# Patient Record
Sex: FEMALE | Race: WHITE
[De-identification: names, ages, dates, MRNs, and addresses within clinical notes are randomized per-mention and may not be internally consistent; named-entity substitution may affect disease eponyms.]

---

## 2023-12-11 ENCOUNTER — APPOINTMENT (OUTPATIENT)
Dept: OBGYN | Facility: CLINIC | Age: 28
End: 2023-12-11
Payer: MEDICAID

## 2023-12-11 VITALS
WEIGHT: 139 LBS | HEIGHT: 66 IN | BODY MASS INDEX: 22.34 KG/M2 | SYSTOLIC BLOOD PRESSURE: 112 MMHG | DIASTOLIC BLOOD PRESSURE: 75 MMHG

## 2023-12-11 DIAGNOSIS — Z32.01 ENCOUNTER FOR PREGNANCY TEST, RESULT POSITIVE: ICD-10-CM

## 2023-12-11 LAB
BILIRUB UR QL STRIP: NORMAL
GLUCOSE UR-MCNC: NORMAL
HCG UR QL: 0.2 EU/DL
HGB UR QL STRIP.AUTO: NORMAL
KETONES UR-MCNC: NORMAL
LEUKOCYTE ESTERASE UR QL STRIP: NORMAL
NITRITE UR QL STRIP: NORMAL
PH UR STRIP: 6.5
PROT UR STRIP-MCNC: NORMAL
SP GR UR STRIP: 1

## 2023-12-11 PROCEDURE — 90715 TDAP VACCINE 7 YRS/> IM: CPT

## 2023-12-11 PROCEDURE — 90471 IMMUNIZATION ADMIN: CPT

## 2023-12-11 PROCEDURE — 99203 OFFICE O/P NEW LOW 30 MIN: CPT | Mod: 25

## 2023-12-11 PROCEDURE — 81003 URINALYSIS AUTO W/O SCOPE: CPT | Mod: QW

## 2023-12-11 PROCEDURE — 76805 OB US >/= 14 WKS SNGL FETUS: CPT

## 2023-12-12 DIAGNOSIS — D64.9 ANEMIA, UNSPECIFIED: ICD-10-CM

## 2023-12-12 LAB
ABO + RH PNL BLD: NORMAL
BLD GP AB SCN SERPL QL: NORMAL
C TRACH RRNA SPEC QL NAA+PROBE: NOT DETECTED
GLUCOSE 1H P 50 G GLC PO SERPL-MCNC: 145 MG/DL
HBV SURFACE AG SER QL: NONREACTIVE
HCT VFR BLD CALC: 32.3 %
HCV AB SER QL: NONREACTIVE
HCV S/CO RATIO: 0.1 S/CO
HGB BLD-MCNC: 9.6 G/DL
HIV1+2 AB SPEC QL IA.RAPID: NONREACTIVE
MCHC RBC-ENTMCNC: 25.2 PG
MCHC RBC-ENTMCNC: 29.7 GM/DL
MCV RBC AUTO: 84.8 FL
MEV IGG FLD QL IA: 31.5 AU/ML
MEV IGG+IGM SER-IMP: POSITIVE
MUV AB SER-ACNC: NEGATIVE
MUV IGG SER QL IA: 8.6 AU/ML
N GONORRHOEA RRNA SPEC QL NAA+PROBE: NOT DETECTED
PLATELET # BLD AUTO: 181 K/UL
RBC # BLD: 3.81 M/UL
RBC # FLD: 15.2 %
RUBV IGG FLD-ACNC: 0.9 INDEX
RUBV IGG SER-IMP: NORMAL
SOURCE AMPLIFICATION: NORMAL
T PALLIDUM AB SER QL IA: NEGATIVE
VZV AB TITR SER: POSITIVE
VZV IGG SER IF-ACNC: 910.6 INDEX
WBC # FLD AUTO: 6.51 K/UL

## 2023-12-12 RX ORDER — CHLORHEXIDINE GLUCONATE 4 %
325 (65 FE) LIQUID (ML) TOPICAL TWICE DAILY
Qty: 120 | Refills: 2 | Status: ACTIVE | COMMUNITY
Start: 2023-12-12 | End: 1900-01-01

## 2023-12-13 LAB
BACTERIA UR CULT: NORMAL
LEAD BLD-MCNC: <1 UG/DL

## 2023-12-21 ENCOUNTER — NON-APPOINTMENT (OUTPATIENT)
Age: 28
End: 2023-12-21

## 2023-12-21 ENCOUNTER — APPOINTMENT (OUTPATIENT)
Dept: OBGYN | Facility: CLINIC | Age: 28
End: 2023-12-21
Payer: MEDICAID

## 2023-12-21 VITALS — WEIGHT: 140 LBS | SYSTOLIC BLOOD PRESSURE: 102 MMHG | DIASTOLIC BLOOD PRESSURE: 68 MMHG | BODY MASS INDEX: 22.6 KG/M2

## 2023-12-21 LAB
BILIRUB UR QL STRIP: NORMAL
GLUCOSE UR-MCNC: NORMAL
HCG UR QL: 0.2 EU/DL
HGB UR QL STRIP.AUTO: NORMAL
KETONES UR-MCNC: NORMAL
LEUKOCYTE ESTERASE UR QL STRIP: NORMAL
NITRITE UR QL STRIP: NORMAL
PH UR STRIP: 8.5
PROT UR STRIP-MCNC: 30
SP GR UR STRIP: 1.02

## 2023-12-21 PROCEDURE — 81003 URINALYSIS AUTO W/O SCOPE: CPT | Mod: NC,QW

## 2023-12-21 PROCEDURE — 76819 FETAL BIOPHYS PROFIL W/O NST: CPT

## 2023-12-21 PROCEDURE — 0501F PRENATAL FLOW SHEET: CPT

## 2023-12-21 PROCEDURE — 99213 OFFICE O/P EST LOW 20 MIN: CPT | Mod: 25

## 2023-12-24 LAB — B-HEM STREP SPEC QL CULT: NORMAL

## 2023-12-27 ENCOUNTER — NON-APPOINTMENT (OUTPATIENT)
Age: 28
End: 2023-12-27

## 2023-12-28 ENCOUNTER — APPOINTMENT (OUTPATIENT)
Dept: OBGYN | Facility: CLINIC | Age: 28
End: 2023-12-28
Payer: MEDICAID

## 2023-12-28 ENCOUNTER — RESULT CHARGE (OUTPATIENT)
Age: 28
End: 2023-12-28

## 2023-12-28 VITALS — DIASTOLIC BLOOD PRESSURE: 71 MMHG | WEIGHT: 141 LBS | SYSTOLIC BLOOD PRESSURE: 105 MMHG | BODY MASS INDEX: 22.76 KG/M2

## 2023-12-28 DIAGNOSIS — Z34.90 ENCOUNTER FOR SUPERVISION OF NORMAL PREGNANCY, UNSPECIFIED, UNSPECIFIED TRIMESTER: ICD-10-CM

## 2023-12-28 DIAGNOSIS — Z00.00 ENCOUNTER FOR GENERAL ADULT MEDICAL EXAMINATION W/OUT ABNORMAL FINDINGS: ICD-10-CM

## 2023-12-28 LAB
BILIRUB UR QL STRIP: NORMAL
GLUCOSE UR-MCNC: NORMAL
HCG UR QL: 0.2 EU/DL
HGB UR QL STRIP.AUTO: NORMAL
KETONES UR-MCNC: NORMAL
LEUKOCYTE ESTERASE UR QL STRIP: NORMAL
NITRITE UR QL STRIP: NORMAL
PH UR STRIP: 7
PROT UR STRIP-MCNC: NORMAL
SP GR UR STRIP: 1.02

## 2023-12-28 PROCEDURE — 99213 OFFICE O/P EST LOW 20 MIN: CPT | Mod: 25

## 2023-12-28 PROCEDURE — 81003 URINALYSIS AUTO W/O SCOPE: CPT | Mod: NC,QW

## 2023-12-28 PROCEDURE — 0502F SUBSEQUENT PRENATAL CARE: CPT

## 2023-12-31 ENCOUNTER — NON-APPOINTMENT (OUTPATIENT)
Age: 28
End: 2023-12-31

## 2024-01-03 ENCOUNTER — NON-APPOINTMENT (OUTPATIENT)
Age: 29
End: 2024-01-03

## 2024-01-03 ENCOUNTER — APPOINTMENT (OUTPATIENT)
Dept: OBGYN | Facility: CLINIC | Age: 29
End: 2024-01-03

## 2024-01-04 ENCOUNTER — INPATIENT (INPATIENT)
Facility: HOSPITAL | Age: 29
LOS: 0 days | Discharge: HOME | DRG: 560 | End: 2024-01-05
Attending: STUDENT IN AN ORGANIZED HEALTH CARE EDUCATION/TRAINING PROGRAM | Admitting: STUDENT IN AN ORGANIZED HEALTH CARE EDUCATION/TRAINING PROGRAM
Payer: MEDICAID

## 2024-01-04 ENCOUNTER — NON-APPOINTMENT (OUTPATIENT)
Age: 29
End: 2024-01-04

## 2024-01-04 VITALS
SYSTOLIC BLOOD PRESSURE: 124 MMHG | HEART RATE: 86 BPM | RESPIRATION RATE: 16 BRPM | DIASTOLIC BLOOD PRESSURE: 81 MMHG | HEIGHT: 66 IN | TEMPERATURE: 98 F | WEIGHT: 139.99 LBS

## 2024-01-04 DIAGNOSIS — O26.899 OTHER SPECIFIED PREGNANCY RELATED CONDITIONS, UNSPECIFIED TRIMESTER: ICD-10-CM

## 2024-01-04 DIAGNOSIS — O26.893 OTHER SPECIFIED PREGNANCY RELATED CONDITIONS, THIRD TRIMESTER: ICD-10-CM

## 2024-01-04 LAB
AMPHET UR-MCNC: NEGATIVE — SIGNIFICANT CHANGE UP
AMPHET UR-MCNC: NEGATIVE — SIGNIFICANT CHANGE UP
APPEARANCE UR: ABNORMAL
APPEARANCE UR: ABNORMAL
BACTERIA # UR AUTO: NEGATIVE /HPF — SIGNIFICANT CHANGE UP
BACTERIA # UR AUTO: NEGATIVE /HPF — SIGNIFICANT CHANGE UP
BARBITURATES UR SCN-MCNC: NEGATIVE — SIGNIFICANT CHANGE UP
BARBITURATES UR SCN-MCNC: NEGATIVE — SIGNIFICANT CHANGE UP
BASOPHILS # BLD AUTO: 0.04 K/UL — SIGNIFICANT CHANGE UP (ref 0–0.2)
BASOPHILS # BLD AUTO: 0.04 K/UL — SIGNIFICANT CHANGE UP (ref 0–0.2)
BASOPHILS NFR BLD AUTO: 0.6 % — SIGNIFICANT CHANGE UP (ref 0–1)
BASOPHILS NFR BLD AUTO: 0.6 % — SIGNIFICANT CHANGE UP (ref 0–1)
BENZODIAZ UR-MCNC: NEGATIVE — SIGNIFICANT CHANGE UP
BENZODIAZ UR-MCNC: NEGATIVE — SIGNIFICANT CHANGE UP
BILIRUB UR-MCNC: NEGATIVE — SIGNIFICANT CHANGE UP
BILIRUB UR-MCNC: NEGATIVE — SIGNIFICANT CHANGE UP
BLD GP AB SCN SERPL QL: SIGNIFICANT CHANGE UP
BLD GP AB SCN SERPL QL: SIGNIFICANT CHANGE UP
BUPRENORPHINE SCREEN, URINE RESULT: NEGATIVE — SIGNIFICANT CHANGE UP
BUPRENORPHINE SCREEN, URINE RESULT: NEGATIVE — SIGNIFICANT CHANGE UP
CAST: 3 /LPF — SIGNIFICANT CHANGE UP (ref 0–4)
CAST: 3 /LPF — SIGNIFICANT CHANGE UP (ref 0–4)
COCAINE METAB.OTHER UR-MCNC: NEGATIVE — SIGNIFICANT CHANGE UP
COCAINE METAB.OTHER UR-MCNC: NEGATIVE — SIGNIFICANT CHANGE UP
COLOR SPEC: YELLOW — SIGNIFICANT CHANGE UP
COLOR SPEC: YELLOW — SIGNIFICANT CHANGE UP
DIFF PNL FLD: ABNORMAL
DIFF PNL FLD: ABNORMAL
EOSINOPHIL # BLD AUTO: 0 K/UL — SIGNIFICANT CHANGE UP (ref 0–0.7)
EOSINOPHIL # BLD AUTO: 0 K/UL — SIGNIFICANT CHANGE UP (ref 0–0.7)
EOSINOPHIL NFR BLD AUTO: 0 % — SIGNIFICANT CHANGE UP (ref 0–8)
EOSINOPHIL NFR BLD AUTO: 0 % — SIGNIFICANT CHANGE UP (ref 0–8)
FENTANYL UR QL: NEGATIVE — SIGNIFICANT CHANGE UP
FENTANYL UR QL: NEGATIVE — SIGNIFICANT CHANGE UP
GLUCOSE UR QL: NEGATIVE MG/DL — SIGNIFICANT CHANGE UP
GLUCOSE UR QL: NEGATIVE MG/DL — SIGNIFICANT CHANGE UP
HCT VFR BLD CALC: 35.5 % — LOW (ref 37–47)
HCT VFR BLD CALC: 35.5 % — LOW (ref 37–47)
HGB BLD-MCNC: 10.8 G/DL — LOW (ref 12–16)
HGB BLD-MCNC: 10.8 G/DL — LOW (ref 12–16)
IMM GRANULOCYTES NFR BLD AUTO: 0.6 % — HIGH (ref 0.1–0.3)
IMM GRANULOCYTES NFR BLD AUTO: 0.6 % — HIGH (ref 0.1–0.3)
KETONES UR-MCNC: NEGATIVE MG/DL — SIGNIFICANT CHANGE UP
KETONES UR-MCNC: NEGATIVE MG/DL — SIGNIFICANT CHANGE UP
L&D DRUG SCREEN, URINE: SIGNIFICANT CHANGE UP
L&D DRUG SCREEN, URINE: SIGNIFICANT CHANGE UP
LEUKOCYTE ESTERASE UR-ACNC: ABNORMAL
LEUKOCYTE ESTERASE UR-ACNC: ABNORMAL
LYMPHOCYTES # BLD AUTO: 1.32 K/UL — SIGNIFICANT CHANGE UP (ref 1.2–3.4)
LYMPHOCYTES # BLD AUTO: 1.32 K/UL — SIGNIFICANT CHANGE UP (ref 1.2–3.4)
LYMPHOCYTES # BLD AUTO: 19.8 % — LOW (ref 20.5–51.1)
LYMPHOCYTES # BLD AUTO: 19.8 % — LOW (ref 20.5–51.1)
MCHC RBC-ENTMCNC: 25.4 PG — LOW (ref 27–31)
MCHC RBC-ENTMCNC: 25.4 PG — LOW (ref 27–31)
MCHC RBC-ENTMCNC: 30.4 G/DL — LOW (ref 32–37)
MCHC RBC-ENTMCNC: 30.4 G/DL — LOW (ref 32–37)
MCV RBC AUTO: 83.5 FL — SIGNIFICANT CHANGE UP (ref 81–99)
MCV RBC AUTO: 83.5 FL — SIGNIFICANT CHANGE UP (ref 81–99)
METHADONE UR-MCNC: NEGATIVE — SIGNIFICANT CHANGE UP
METHADONE UR-MCNC: NEGATIVE — SIGNIFICANT CHANGE UP
MONOCYTES # BLD AUTO: 0.63 K/UL — HIGH (ref 0.1–0.6)
MONOCYTES # BLD AUTO: 0.63 K/UL — HIGH (ref 0.1–0.6)
MONOCYTES NFR BLD AUTO: 9.4 % — HIGH (ref 1.7–9.3)
MONOCYTES NFR BLD AUTO: 9.4 % — HIGH (ref 1.7–9.3)
NEUTROPHILS # BLD AUTO: 4.64 K/UL — SIGNIFICANT CHANGE UP (ref 1.4–6.5)
NEUTROPHILS # BLD AUTO: 4.64 K/UL — SIGNIFICANT CHANGE UP (ref 1.4–6.5)
NEUTROPHILS NFR BLD AUTO: 69.6 % — SIGNIFICANT CHANGE UP (ref 42.2–75.2)
NEUTROPHILS NFR BLD AUTO: 69.6 % — SIGNIFICANT CHANGE UP (ref 42.2–75.2)
NITRITE UR-MCNC: NEGATIVE — SIGNIFICANT CHANGE UP
NITRITE UR-MCNC: NEGATIVE — SIGNIFICANT CHANGE UP
NRBC # BLD: 0 /100 WBCS — SIGNIFICANT CHANGE UP (ref 0–0)
NRBC # BLD: 0 /100 WBCS — SIGNIFICANT CHANGE UP (ref 0–0)
OPIATES UR-MCNC: NEGATIVE — SIGNIFICANT CHANGE UP
OPIATES UR-MCNC: NEGATIVE — SIGNIFICANT CHANGE UP
OXYCODONE UR-MCNC: NEGATIVE — SIGNIFICANT CHANGE UP
OXYCODONE UR-MCNC: NEGATIVE — SIGNIFICANT CHANGE UP
PCP UR-MCNC: NEGATIVE — SIGNIFICANT CHANGE UP
PCP UR-MCNC: NEGATIVE — SIGNIFICANT CHANGE UP
PH UR: 8 — SIGNIFICANT CHANGE UP (ref 5–8)
PH UR: 8 — SIGNIFICANT CHANGE UP (ref 5–8)
PLATELET # BLD AUTO: 149 K/UL — SIGNIFICANT CHANGE UP (ref 130–400)
PLATELET # BLD AUTO: 149 K/UL — SIGNIFICANT CHANGE UP (ref 130–400)
PMV BLD: SIGNIFICANT CHANGE UP (ref 7.4–10.4)
PMV BLD: SIGNIFICANT CHANGE UP (ref 7.4–10.4)
PRENATAL SYPHILIS TEST: SIGNIFICANT CHANGE UP
PRENATAL SYPHILIS TEST: SIGNIFICANT CHANGE UP
PROPOXYPHENE QUALITATIVE URINE RESULT: NEGATIVE — SIGNIFICANT CHANGE UP
PROPOXYPHENE QUALITATIVE URINE RESULT: NEGATIVE — SIGNIFICANT CHANGE UP
PROT UR-MCNC: 30 MG/DL
PROT UR-MCNC: 30 MG/DL
RBC # BLD: 4.25 M/UL — SIGNIFICANT CHANGE UP (ref 4.2–5.4)
RBC # BLD: 4.25 M/UL — SIGNIFICANT CHANGE UP (ref 4.2–5.4)
RBC # FLD: 18.3 % — HIGH (ref 11.5–14.5)
RBC # FLD: 18.3 % — HIGH (ref 11.5–14.5)
RBC CASTS # UR COMP ASSIST: 72 /HPF — HIGH (ref 0–4)
RBC CASTS # UR COMP ASSIST: 72 /HPF — HIGH (ref 0–4)
SP GR SPEC: 1.01 — SIGNIFICANT CHANGE UP (ref 1–1.03)
SP GR SPEC: 1.01 — SIGNIFICANT CHANGE UP (ref 1–1.03)
SQUAMOUS # UR AUTO: 7 /HPF — HIGH (ref 0–5)
SQUAMOUS # UR AUTO: 7 /HPF — HIGH (ref 0–5)
UROBILINOGEN FLD QL: 0.2 MG/DL — SIGNIFICANT CHANGE UP (ref 0.2–1)
UROBILINOGEN FLD QL: 0.2 MG/DL — SIGNIFICANT CHANGE UP (ref 0.2–1)
WBC # BLD: 6.67 K/UL — SIGNIFICANT CHANGE UP (ref 4.8–10.8)
WBC # BLD: 6.67 K/UL — SIGNIFICANT CHANGE UP (ref 4.8–10.8)
WBC # FLD AUTO: 6.67 K/UL — SIGNIFICANT CHANGE UP (ref 4.8–10.8)
WBC # FLD AUTO: 6.67 K/UL — SIGNIFICANT CHANGE UP (ref 4.8–10.8)
WBC UR QL: 31 /HPF — HIGH (ref 0–5)
WBC UR QL: 31 /HPF — HIGH (ref 0–5)

## 2024-01-04 PROCEDURE — 81001 URINALYSIS AUTO W/SCOPE: CPT

## 2024-01-04 PROCEDURE — 36415 COLL VENOUS BLD VENIPUNCTURE: CPT

## 2024-01-04 PROCEDURE — 80307 DRUG TEST PRSMV CHEM ANLYZR: CPT

## 2024-01-04 PROCEDURE — 59410 OBSTETRICAL CARE: CPT | Mod: U9

## 2024-01-04 PROCEDURE — 80354 DRUG SCREENING FENTANYL: CPT

## 2024-01-04 PROCEDURE — 86850 RBC ANTIBODY SCREEN: CPT

## 2024-01-04 PROCEDURE — 85025 COMPLETE CBC W/AUTO DIFF WBC: CPT

## 2024-01-04 PROCEDURE — 86901 BLOOD TYPING SEROLOGIC RH(D): CPT

## 2024-01-04 PROCEDURE — 59050 FETAL MONITOR W/REPORT: CPT

## 2024-01-04 PROCEDURE — 86900 BLOOD TYPING SEROLOGIC ABO: CPT

## 2024-01-04 PROCEDURE — 86592 SYPHILIS TEST NON-TREP QUAL: CPT

## 2024-01-04 RX ORDER — DIBUCAINE 1 %
1 OINTMENT (GRAM) RECTAL EVERY 6 HOURS
Refills: 0 | Status: DISCONTINUED | OUTPATIENT
Start: 2024-01-04 | End: 2024-01-05

## 2024-01-04 RX ORDER — INFLUENZA VIRUS VACCINE 15; 15; 15; 15 UG/.5ML; UG/.5ML; UG/.5ML; UG/.5ML
0.5 SUSPENSION INTRAMUSCULAR ONCE
Refills: 0 | Status: DISCONTINUED | OUTPATIENT
Start: 2024-01-04 | End: 2024-01-04

## 2024-01-04 RX ORDER — OXYCODONE HYDROCHLORIDE 5 MG/1
5 TABLET ORAL
Refills: 0 | Status: DISCONTINUED | OUTPATIENT
Start: 2024-01-04 | End: 2024-01-05

## 2024-01-04 RX ORDER — PRAMOXINE HYDROCHLORIDE 150 MG/15G
1 AEROSOL, FOAM RECTAL EVERY 4 HOURS
Refills: 0 | Status: DISCONTINUED | OUTPATIENT
Start: 2024-01-04 | End: 2024-01-05

## 2024-01-04 RX ORDER — IBUPROFEN 200 MG
600 TABLET ORAL EVERY 6 HOURS
Refills: 0 | Status: COMPLETED | OUTPATIENT
Start: 2024-01-04 | End: 2024-12-02

## 2024-01-04 RX ORDER — OXYCODONE HYDROCHLORIDE 5 MG/1
5 TABLET ORAL ONCE
Refills: 0 | Status: DISCONTINUED | OUTPATIENT
Start: 2024-01-04 | End: 2024-01-05

## 2024-01-04 RX ORDER — AER TRAVELER 0.5 G/1
1 SOLUTION RECTAL; TOPICAL EVERY 4 HOURS
Refills: 0 | Status: DISCONTINUED | OUTPATIENT
Start: 2024-01-04 | End: 2024-01-05

## 2024-01-04 RX ORDER — IBUPROFEN 200 MG
600 TABLET ORAL EVERY 6 HOURS
Refills: 0 | Status: DISCONTINUED | OUTPATIENT
Start: 2024-01-04 | End: 2024-01-05

## 2024-01-04 RX ORDER — LANOLIN
1 OINTMENT (GRAM) TOPICAL EVERY 6 HOURS
Refills: 0 | Status: DISCONTINUED | OUTPATIENT
Start: 2024-01-04 | End: 2024-01-05

## 2024-01-04 RX ORDER — ACETAMINOPHEN 500 MG
975 TABLET ORAL
Refills: 0 | Status: DISCONTINUED | OUTPATIENT
Start: 2024-01-04 | End: 2024-01-05

## 2024-01-04 RX ORDER — BENZOCAINE 10 %
1 GEL (GRAM) MUCOUS MEMBRANE EVERY 6 HOURS
Refills: 0 | Status: DISCONTINUED | OUTPATIENT
Start: 2024-01-04 | End: 2024-01-05

## 2024-01-04 RX ORDER — KETOROLAC TROMETHAMINE 30 MG/ML
30 SYRINGE (ML) INJECTION ONCE
Refills: 0 | Status: DISCONTINUED | OUTPATIENT
Start: 2024-01-04 | End: 2024-01-04

## 2024-01-04 RX ORDER — SODIUM CHLORIDE 9 MG/ML
1000 INJECTION, SOLUTION INTRAVENOUS
Refills: 0 | Status: DISCONTINUED | OUTPATIENT
Start: 2024-01-04 | End: 2024-01-04

## 2024-01-04 RX ORDER — DIPHENHYDRAMINE HCL 50 MG
25 CAPSULE ORAL EVERY 6 HOURS
Refills: 0 | Status: DISCONTINUED | OUTPATIENT
Start: 2024-01-04 | End: 2024-01-05

## 2024-01-04 RX ORDER — OXYTOCIN 10 UNIT/ML
333.33 VIAL (ML) INJECTION
Qty: 20 | Refills: 0 | Status: DISCONTINUED | OUTPATIENT
Start: 2024-01-04 | End: 2024-01-05

## 2024-01-04 RX ORDER — MAGNESIUM HYDROXIDE 400 MG/1
30 TABLET, CHEWABLE ORAL
Refills: 0 | Status: DISCONTINUED | OUTPATIENT
Start: 2024-01-04 | End: 2024-01-05

## 2024-01-04 RX ORDER — SIMETHICONE 80 MG/1
80 TABLET, CHEWABLE ORAL EVERY 4 HOURS
Refills: 0 | Status: DISCONTINUED | OUTPATIENT
Start: 2024-01-04 | End: 2024-01-05

## 2024-01-04 RX ORDER — TETANUS TOXOID, REDUCED DIPHTHERIA TOXOID AND ACELLULAR PERTUSSIS VACCINE, ADSORBED 5; 2.5; 8; 8; 2.5 [IU]/.5ML; [IU]/.5ML; UG/.5ML; UG/.5ML; UG/.5ML
0.5 SUSPENSION INTRAMUSCULAR ONCE
Refills: 0 | Status: DISCONTINUED | OUTPATIENT
Start: 2024-01-04 | End: 2024-01-05

## 2024-01-04 RX ORDER — SODIUM CHLORIDE 9 MG/ML
3 INJECTION INTRAMUSCULAR; INTRAVENOUS; SUBCUTANEOUS EVERY 8 HOURS
Refills: 0 | Status: DISCONTINUED | OUTPATIENT
Start: 2024-01-04 | End: 2024-01-05

## 2024-01-04 RX ORDER — OXYTOCIN 10 UNIT/ML
333.33 VIAL (ML) INJECTION
Qty: 20 | Refills: 0 | Status: DISCONTINUED | OUTPATIENT
Start: 2024-01-04 | End: 2024-01-04

## 2024-01-04 RX ORDER — HYDROCORTISONE 1 %
1 OINTMENT (GRAM) TOPICAL EVERY 6 HOURS
Refills: 0 | Status: DISCONTINUED | OUTPATIENT
Start: 2024-01-04 | End: 2024-01-05

## 2024-01-04 RX ADMIN — Medication 975 MILLIGRAM(S): at 12:28

## 2024-01-04 RX ADMIN — Medication 975 MILLIGRAM(S): at 23:55

## 2024-01-04 RX ADMIN — Medication 1 TABLET(S): at 12:58

## 2024-01-04 RX ADMIN — Medication 600 MILLIGRAM(S): at 21:22

## 2024-01-04 RX ADMIN — Medication 30 MILLIGRAM(S): at 10:20

## 2024-01-04 RX ADMIN — Medication 975 MILLIGRAM(S): at 19:04

## 2024-01-04 RX ADMIN — Medication 975 MILLIGRAM(S): at 23:25

## 2024-01-04 RX ADMIN — Medication 600 MILLIGRAM(S): at 20:39

## 2024-01-04 RX ADMIN — Medication 975 MILLIGRAM(S): at 12:58

## 2024-01-04 RX ADMIN — SODIUM CHLORIDE 3 MILLILITER(S): 9 INJECTION INTRAMUSCULAR; INTRAVENOUS; SUBCUTANEOUS at 22:12

## 2024-01-04 RX ADMIN — Medication 975 MILLIGRAM(S): at 18:34

## 2024-01-04 RX ADMIN — SODIUM CHLORIDE 3 MILLILITER(S): 9 INJECTION INTRAMUSCULAR; INTRAVENOUS; SUBCUTANEOUS at 18:34

## 2024-01-04 NOTE — PROCEDURE NOTE - NSSITEPREP_SKIN_A_CORE
7/3/2020 at 0840 a.m. right  implanted port accessed using a 20 gauge non-coring needle primed with 0.9% sodium chloride.  Good blood return obtained.  Blood obtained and sent to lab. Flushed with 20ml 0.9% sodium chloride. Implanted port site is not swollen.  Patient tolerated procedure well.     chlorhexidine

## 2024-01-04 NOTE — OB RN DELIVERY SUMMARY - NSSELHIDDEN_OBGYN_ALL_OB_FT
[NS_DeliveryAttending1_OBGYN_ALL_OB_FT:[id number]=],[NS_DeliveryRN_OBGYN_ALL_OB_FT:[id number]=] [NS_DeliveryAttending1_OBGYN_ALL_OB_FT:[id number]=],[NS_DeliveryRN_OBGYN_ALL_OB_FT:[id number]=] [NS_DeliveryAttending1_OBGYN_ALL_OB_FT:[id number]=],[NS_DeliveryRN_OBGYN_ALL_OB_FT:[id number]=],[NS_DeliveryAssist1_OBGYN_ALL_OB_FT:[id number]=],[NS_CirculateRN2_OBGYN_ALL_OB_FT:[id number]=] [NS_DeliveryAttending1_OBGYN_ALL_OB_FT:[id number]=],[NS_DeliveryRN_OBGYN_ALL_OB_FT:[id number]=],[NS_DeliveryAssist1_OBGYN_ALL_OB_FT:[id number]=],[NS_CirculateRN2_OBGYN_ALL_OB_FT:[id number]=]

## 2024-01-04 NOTE — PROCEDURE NOTE - NSATTENDNOT_OBGYN_ALL_OB
11/02/21 1443   Provider Notification   Provider Name/Title Dr Maru Antunez   Method of Notification Phone   Request Evaluate - Remote   Notification Reason Status Update   Updated MD that pt's RR went down to 25/min, pt denies any LLQ pain but has complaint of heartburn. MD gave TORB to discharge pt to home and pt may take pepcid prn heartburn and follow-up with appt tomorrow via phone with Dr Rogers.   Yes, Attending Physician has been notified

## 2024-01-04 NOTE — OB PROVIDER H&P - ASSESSMENT
27 y/o  at 39w1d, GBS neg, SROM, in labor  - admit to L&D  - admission labs  - cont efm/toco  - pain management prn- epidural  - clear liquid diet

## 2024-01-04 NOTE — PROCEDURE NOTE - ADDITIONAL PROCEDURE DETAILS
Thorough discussion of patient's history, as indicated above.  Discussed risks of spinal/epidural, including PDPH, inadequate analgesia occasionally requiring epidural catheter replacement/ general anesthesia, bleeding, infection and spinal cord injury. hypotension and nausea. Patient expressed understanding of these risks, signed informed consent and wishes to proceed with spinal/epidural    Patient sitting. Lumbar epidural performed at L3-4. Standard ASA monitors including FHR. Sterile gloves, Chloro-prep. 1% lidocaine for local infiltration. 17g touhy. KE with air at 5 cm. 27g shashi used to make a dural puncture with + CSF. .25% Bupivacaine 1cc administered through spinal needle. Shashi needle removed and epidural catheter threaded easily. Touhy needle removed. Catheter secured in place at 10  cm. Negative aspiration. Test dose consisting of 3ml 1.5% lidocaine with epinephrine was negative. 2cc 1.5% Epi and epidural administered. 6cc .125% Bupivacaine.  Patient tolerated procedure and was hemodynamically stable throughout. T10 level bilaterally.    Post Procedure Patient evaluated at bedside.  Hemodynamically stable, degree of motor block appropriate for epidural medication administered. Pain is well controlled bilaterally. Patient is aware that the anesthesia team is available 24/7, in case she needs more pain medication or has any other anesthesia-related needs or questions.

## 2024-01-04 NOTE — OB PROVIDER DELIVERY SUMMARY - NSSELHIDDEN_OBGYN_ALL_OB_FT
[NS_DeliveryAttending1_OBGYN_ALL_OB_FT:[id number]=],[NS_DeliveryRN_OBGYN_ALL_OB_FT:[id number]=],[NS_DeliveryAssist1_OBGYN_ALL_OB_FT:[id number]=],[NS_CirculateRN2_OBGYN_ALL_OB_FT:[id number]=] [NS_DeliveryAttending1_OBGYN_ALL_OB_FT:[id number]=],[NS_DeliveryRN_OBGYN_ALL_OB_FT:[id number]=],[NS_DeliveryAssist1_OBGYN_ALL_OB_FT:[id number]=],[NS_CirculateRN2_OBGYN_ALL_OB_FT:[id number]=]

## 2024-01-04 NOTE — OB RN PATIENT PROFILE - NSSDOHHEADEN_OBGYN_A_OB
"Pt c/o shortness of breath since discharge from [facility] 1/29. Pt discharged home with home oxygen 2L/NC. Shortness of breath has been getting increasingly worse since discharge, stated "I cant breathe and it I get worked up   about not being able to breathe." Pt also c/o bilateral lower extremity pitting edema that noticed today. Also c/o left breast red warm and swollen. Mild respiratory distress noted. Denies cough, denies chest pain  " no

## 2024-01-04 NOTE — OB RN TRIAGE NOTE - FALL HARM RISK - UNIVERSAL INTERVENTIONS
Bed in lowest position, wheels locked, appropriate side rails in place/Call bell, personal items and telephone in reach/Instruct patient to call for assistance before getting out of bed or chair/Non-slip footwear when patient is out of bed/[place] to call system/Physically safe environment - no spills, clutter or unnecessary equipment/Purposeful Proactive Rounding/Room/bathroom lighting operational, light cord in reach Bed in lowest position, wheels locked, appropriate side rails in place/Call bell, personal items and telephone in reach/Instruct patient to call for assistance before getting out of bed or chair/Non-slip footwear when patient is out of bed/[place] to call system/Physically safe environment - no spills, clutter or unnecessary equipment/Purposeful Proactive Rounding/Room/bathroom lighting operational, light cord in reach

## 2024-01-04 NOTE — OB PROVIDER DELIVERY SUMMARY - NSPROVIDERDELIVERYNOTE_OBGYN_ALL_OB_FT
Patient was fully dilated and pushing. Fetal head was OP and restituted to ROT. The anterior and posterior shoulders delivered, followed by the remaining body atraumatically. Delayed cord clamping was performed, and then clamped and cut. Cord blood gases collected x2. The  was handed to the mother and RN. The placenta delivered intact with membranes. Pitocin was administered. Uterus massaged, fundus found to be firm. Cervix, vagina and perineum inspected, 2nd degree laceration was noted, repaired using 3-0 chromic gut in the usual fashion with good hemostasis.     Viable male infant delivered with APGARs 9/9    Laceration: 2nd degree  EBL 50 cc    Dr. Aguilar present for the delivery

## 2024-01-04 NOTE — OB RN PATIENT PROFILE - PRO PARENT CONCERN YN OB
-- DO NOT REPLY / DO NOT REPLY ALL --  -- Message is from the [facility]--    COVID-19 Universal Screening: N/A - Not about scheduling    General Patient Message      Reason for Call: The patient is calling she would like to ask for as call back she is supposed to get antibiotics through a P[facility] Line she would like to know if the nurse can give it to her if she come to the office each morning for the next three days. Please call as soon as possible     Caller Information       Type Contact Phone    05/12/2021 07:31 AM CDT Phone (Incoming) Shreya Rider (Self) [phone] (M)          Alternative phone number:[phone]     Turnaround time given to caller:   \"This message will be sent to [state Provider's name]. The clinical team will fulfill your request as soon as they review your message.\"     no

## 2024-01-04 NOTE — OB PROVIDER H&P - NSHPPHYSICALEXAM_GEN_ALL_CORE
Vital Signs Last 24 Hrs  T(C): 36.6 (04 Jan 2024 07:27), Max: 36.7 (04 Jan 2024 05:46)  T(F): 97.88 (04 Jan 2024 07:27), Max: 98 (04 Jan 2024 05:46)  HR: 97 (04 Jan 2024 07:45) (66 - 102)  BP: 114/71 (04 Jan 2024 07:36) (109/70 - 139/68)  RR: 16 (04 Jan 2024 05:46) (16 - 16)  SpO2: 99% (04 Jan 2024 07:45) (73% - 100%)    Parameters below as of 04 Jan 2024 05:46  Patient On (Oxygen Delivery Method): room air    EFM: 140/mod variability/accels +  toco: q3mins  SVE: 5/70/-2 vertex by sono, grossly ruptured

## 2024-01-04 NOTE — OB RN PATIENT PROFILE - FALL HARM RISK - UNIVERSAL INTERVENTIONS
Bed in lowest position, wheels locked, appropriate side rails in place/Call bell, personal items and telephone in reach/Instruct patient to call for assistance before getting out of bed or chair/Non-slip footwear when patient is out of bed/[place] to call system/Physically safe environment - no spills, clutter or unnecessary equipment/Purposeful Proactive Rounding/Room/bathroom lighting operational, light cord in reach Bed in lowest position, wheels locked, appropriate side rails in place/Call bell, personal items and telephone in reach/Instruct patient to call for assistance before getting out of bed or chair/Non-slip footwear when patient is out of bed/[place] to call system/Physically safe environment - no spills, clutter or unnecessary equipment/Purposeful Proactive Rounding/Room/bathroom lighting operational, light cord in reach

## 2024-01-04 NOTE — OB PROVIDER H&P - NS_CSECTION_OBGYN_ALL_OB_NU
Ilumya Counseling: I discussed with the patient the risks of tildrakizumab including but not limited to immunosuppression, malignancy, posterior leukoencephalopathy syndrome, and serious infections.  The patient understands that monitoring is required including a PPD at baseline and must alert us or the primary physician if symptoms of infection or other concerning signs are noted. 0

## 2024-01-04 NOTE — OB PROVIDER H&P - HISTORY OF PRESENT ILLNESS
29 y/o  at 39w1d, WIL 1/10/2024, dated by sonogram, presents with leakage of fluid @0300 with contractions q5mins. Denies any complications this pregnancy. GBS neg.  27 y/o  at 39w1d, WIL 1/10/2024, dated by sonogram, presents with leakage of fluid @0300 with contractions q5mins. Denies any complications this pregnancy. GBS neg.

## 2024-01-05 ENCOUNTER — TRANSCRIPTION ENCOUNTER (OUTPATIENT)
Age: 29
End: 2024-01-05

## 2024-01-05 VITALS
SYSTOLIC BLOOD PRESSURE: 108 MMHG | TEMPERATURE: 98 F | HEART RATE: 66 BPM | RESPIRATION RATE: 18 BRPM | DIASTOLIC BLOOD PRESSURE: 65 MMHG

## 2024-01-05 LAB
BASOPHILS # BLD AUTO: 0.06 K/UL — SIGNIFICANT CHANGE UP (ref 0–0.2)
BASOPHILS # BLD AUTO: 0.06 K/UL — SIGNIFICANT CHANGE UP (ref 0–0.2)
BASOPHILS NFR BLD AUTO: 0.9 % — SIGNIFICANT CHANGE UP (ref 0–1)
BASOPHILS NFR BLD AUTO: 0.9 % — SIGNIFICANT CHANGE UP (ref 0–1)
EOSINOPHIL # BLD AUTO: 0.19 K/UL — SIGNIFICANT CHANGE UP (ref 0–0.7)
EOSINOPHIL # BLD AUTO: 0.19 K/UL — SIGNIFICANT CHANGE UP (ref 0–0.7)
EOSINOPHIL NFR BLD AUTO: 2.7 % — SIGNIFICANT CHANGE UP (ref 0–8)
EOSINOPHIL NFR BLD AUTO: 2.7 % — SIGNIFICANT CHANGE UP (ref 0–8)
HCT VFR BLD CALC: 33.2 % — LOW (ref 37–47)
HCT VFR BLD CALC: 33.2 % — LOW (ref 37–47)
HGB BLD-MCNC: 9.9 G/DL — LOW (ref 12–16)
HGB BLD-MCNC: 9.9 G/DL — LOW (ref 12–16)
IMM GRANULOCYTES NFR BLD AUTO: 0.6 % — HIGH (ref 0.1–0.3)
IMM GRANULOCYTES NFR BLD AUTO: 0.6 % — HIGH (ref 0.1–0.3)
LYMPHOCYTES # BLD AUTO: 1.77 K/UL — SIGNIFICANT CHANGE UP (ref 1.2–3.4)
LYMPHOCYTES # BLD AUTO: 1.77 K/UL — SIGNIFICANT CHANGE UP (ref 1.2–3.4)
LYMPHOCYTES # BLD AUTO: 25.2 % — SIGNIFICANT CHANGE UP (ref 20.5–51.1)
LYMPHOCYTES # BLD AUTO: 25.2 % — SIGNIFICANT CHANGE UP (ref 20.5–51.1)
MCHC RBC-ENTMCNC: 25.6 PG — LOW (ref 27–31)
MCHC RBC-ENTMCNC: 25.6 PG — LOW (ref 27–31)
MCHC RBC-ENTMCNC: 29.8 G/DL — LOW (ref 32–37)
MCHC RBC-ENTMCNC: 29.8 G/DL — LOW (ref 32–37)
MCV RBC AUTO: 86 FL — SIGNIFICANT CHANGE UP (ref 81–99)
MCV RBC AUTO: 86 FL — SIGNIFICANT CHANGE UP (ref 81–99)
MONOCYTES # BLD AUTO: 0.6 K/UL — SIGNIFICANT CHANGE UP (ref 0.1–0.6)
MONOCYTES # BLD AUTO: 0.6 K/UL — SIGNIFICANT CHANGE UP (ref 0.1–0.6)
MONOCYTES NFR BLD AUTO: 8.5 % — SIGNIFICANT CHANGE UP (ref 1.7–9.3)
MONOCYTES NFR BLD AUTO: 8.5 % — SIGNIFICANT CHANGE UP (ref 1.7–9.3)
NEUTROPHILS # BLD AUTO: 4.36 K/UL — SIGNIFICANT CHANGE UP (ref 1.4–6.5)
NEUTROPHILS # BLD AUTO: 4.36 K/UL — SIGNIFICANT CHANGE UP (ref 1.4–6.5)
NEUTROPHILS NFR BLD AUTO: 62.1 % — SIGNIFICANT CHANGE UP (ref 42.2–75.2)
NEUTROPHILS NFR BLD AUTO: 62.1 % — SIGNIFICANT CHANGE UP (ref 42.2–75.2)
NRBC # BLD: 0 /100 WBCS — SIGNIFICANT CHANGE UP (ref 0–0)
NRBC # BLD: 0 /100 WBCS — SIGNIFICANT CHANGE UP (ref 0–0)
PLATELET # BLD AUTO: 142 K/UL — SIGNIFICANT CHANGE UP (ref 130–400)
PLATELET # BLD AUTO: 142 K/UL — SIGNIFICANT CHANGE UP (ref 130–400)
PMV BLD: 14.6 FL — HIGH (ref 7.4–10.4)
PMV BLD: 14.6 FL — HIGH (ref 7.4–10.4)
RBC # BLD: 3.86 M/UL — LOW (ref 4.2–5.4)
RBC # BLD: 3.86 M/UL — LOW (ref 4.2–5.4)
RBC # FLD: 18.8 % — HIGH (ref 11.5–14.5)
RBC # FLD: 18.8 % — HIGH (ref 11.5–14.5)
WBC # BLD: 7.02 K/UL — SIGNIFICANT CHANGE UP (ref 4.8–10.8)
WBC # BLD: 7.02 K/UL — SIGNIFICANT CHANGE UP (ref 4.8–10.8)
WBC # FLD AUTO: 7.02 K/UL — SIGNIFICANT CHANGE UP (ref 4.8–10.8)
WBC # FLD AUTO: 7.02 K/UL — SIGNIFICANT CHANGE UP (ref 4.8–10.8)

## 2024-01-05 RX ORDER — IBUPROFEN 200 MG
1 TABLET ORAL
Qty: 0 | Refills: 0 | DISCHARGE
Start: 2024-01-05

## 2024-01-05 RX ADMIN — Medication 1 TABLET(S): at 11:58

## 2024-01-05 RX ADMIN — Medication 975 MILLIGRAM(S): at 06:38

## 2024-01-05 RX ADMIN — Medication 600 MILLIGRAM(S): at 04:33

## 2024-01-05 RX ADMIN — Medication 975 MILLIGRAM(S): at 05:55

## 2024-01-05 RX ADMIN — Medication 600 MILLIGRAM(S): at 03:43

## 2024-01-05 RX ADMIN — SODIUM CHLORIDE 3 MILLILITER(S): 9 INJECTION INTRAMUSCULAR; INTRAVENOUS; SUBCUTANEOUS at 06:51

## 2024-01-05 RX ADMIN — Medication 600 MILLIGRAM(S): at 09:42

## 2024-01-05 RX ADMIN — Medication 600 MILLIGRAM(S): at 09:12

## 2024-01-05 NOTE — DISCHARGE NOTE OB - HOSPITAL COURSE
24 @ 09:52    HPI:  29 y/o  at 39w1d, WIL 1/10/2024, dated by sonogram, presents with leakage of fluid @0300 with contractions q5mins. Denies any complications this pregnancy. GBS neg.  (2024 07:35)      PAST MEDICAL & SURGICAL HISTORY:  No pertinent past medical history      No significant past surgical history          POST PARTUM COURSE:          LABS:                        9.9    7.02  )-----------( 142      ( 2024 07:50 )             33.2                         10.8   6.67  )-----------( 149      ( 2024 06:00 )             35.5                   Allergies    No Known Allergies    Intolerances

## 2024-01-05 NOTE — PROGRESS NOTE ADULT - SUBJECTIVE AND OBJECTIVE BOX
OB attending  PPD #1    Pt doing well, pain well controlled. No overnight events, no acute complaints.    Ambulating: Yes  Voiding: Yes  Flatus: Yes  Bowel movements: Yes   Breast or bottle feeding: Breastfeeding  Diet: Regular    PAST MEDICAL & SURGICAL HISTORY:  No pertinent past medical history      No significant past surgical history          Physical Exam  Vital Signs Last 24 Hrs  T(C): 36.7 (2024 23:20), Max: 37.2 (2024 10:54)  T(F): 98.1 (2024 23:20), Max: 98.9 (2024 10:54)  HR: 71 (2024 23:20) (69 - 179)  BP: 96/54 (2024 23:20) (93/56 - 135/59)  BP(mean): --  RR: 18 (2024 23:20) (16 - 18)  SpO2: 96% (2024 08:56) (78% - 100%)      Gen: AAOx3, NAD  Abd: Soft, nontender, nondistended, BS+  Fundus: Firm, below umbilicus  Lochia: normal  Ext: No calf tenderness, no swelling    Labs:                        10.8   6.67  )-----------( 149      ( 2024 06:00 )             35.5         A/P: yo G P @ wks, s/p , PPD #1, doing well  - continue current management  d/c home

## 2024-01-05 NOTE — DISCHARGE NOTE OB - NS MD DC FALL RISK RISK
For information on Fall & Injury Prevention, visit: [email or web address] OR  [email or web address] OR  https://www.cdc.gov/steadi/patient.html For information on Fall & Injury Prevention, visit: [email or web address] OR  [email or web address] OR  https://www.cdc.gov/steadi/patient.html

## 2024-01-05 NOTE — DISCHARGE NOTE OB - CARE PROVIDER_API CALL
Jean Carlos Mendez  Obstetrics and Gynecology  [address] [place] [address]  Phone: [phone]  Fax: [phone]  Follow Up Time:    Jean Carlos Mendez  Obstetrics and Gynecology  [address] [place] [address]  Phone: [phone]  Fax: [phone]  Follow Up Time:

## 2024-01-05 NOTE — DISCHARGE NOTE OB - PATIENT PORTAL LINK FT
You can access the FollowMyHealth Patient Portal offered by [facility] by registering at the following website: [email or web address]. By joining [employer]s FollowMyHealth portal, you will also be able to view your health information using other applications (apps) compatible with our system. You can access the FollowMyHealth Patient Portal offered by [facility] by registering at the following website: [email or web address]. By joining [employer]’s FollowMyHealth portal, you will also be able to view your health information using other applications (apps) compatible with our system.

## 2024-01-09 DIAGNOSIS — Z28.09 IMMUNIZATION NOT CARRIED OUT BECAUSE OF OTHER CONTRAINDICATION: ICD-10-CM

## 2024-01-09 DIAGNOSIS — Z3A.39 39 WEEKS GESTATION OF PREGNANCY: ICD-10-CM

## 2024-01-09 DIAGNOSIS — Z28.21 IMMUNIZATION NOT CARRIED OUT BECAUSE OF PATIENT REFUSAL: ICD-10-CM

## 2024-01-12 ENCOUNTER — APPOINTMENT (OUTPATIENT)
Dept: OBGYN | Facility: CLINIC | Age: 29
End: 2024-01-12

## 2025-04-10 NOTE — DISCHARGE NOTE OB - AFTER URINATION AND/OR BOWEL MOVEMENT, CLEAN WITH WARM WATER USING A PERI- BOTTLE, THEN PAT DRY WITH TOILET TISSUE
Interval History:     Review of Systems   Constitutional:  Negative for appetite change, fatigue and fever.   HENT:  Positive for trouble swallowing. Negative for congestion and hearing loss.    Respiratory:  Positive for shortness of breath. Negative for chest tightness and wheezing.    Cardiovascular:  Negative for chest pain and palpitations.   Gastrointestinal:  Negative for abdominal pain, constipation and nausea.   Genitourinary:  Negative for difficulty urinating and dysuria.   Musculoskeletal:  Positive for gait problem. Negative for back pain and neck stiffness.   Skin:  Negative for pallor and rash.   Neurological:  Negative for dizziness, speech difficulty and headaches.   Psychiatric/Behavioral:  Positive for sleep disturbance. Negative for confusion and suicidal ideas.      Objective:     Vital Signs (Most Recent):  Temp: 97.4 °F (36.3 °C) (04/09/25 2136)  Pulse: 96 (04/09/25 2136)  Resp: (!) 22 (04/09/25 2136)  BP: 109/68 (04/09/25 2136)  SpO2: 95 % (04/09/25 2136) Vital Signs (24h Range):  Temp:  [97.3 °F (36.3 °C)-97.7 °F (36.5 °C)] 97.4 °F (36.3 °C)  Pulse:  [] 96  Resp:  [18-28] 22  SpO2:  [92 %-100 %] 95 %  BP: ()/(54-76) 109/68     Weight: (!) 136.1 kg (300 lb)  Body mass index is 49.92 kg/m².    Intake/Output Summary (Last 24 hours) at 4/9/2025 2335  Last data filed at 4/9/2025 1715  Gross per 24 hour   Intake 0 ml   Output --   Net 0 ml         Physical Exam  Vitals reviewed.   Constitutional:       General: She is awake. She is not in acute distress.     Appearance: She is well-developed. She is morbidly obese. She is not toxic-appearing.   HENT:      Head: Normocephalic.      Nose: Nose normal.      Mouth/Throat:      Pharynx: Oropharynx is clear.   Eyes:      Extraocular Movements: Extraocular movements intact.      Pupils: Pupils are equal, round, and reactive to light.   Neck:      Thyroid: No thyroid mass.      Vascular: No carotid bruit.   Cardiovascular:      Rate and  Rhythm: Normal rate and regular rhythm.      Pulses: Normal pulses.      Heart sounds: Normal heart sounds. No murmur heard.  Pulmonary:      Effort: Pulmonary effort is normal.      Breath sounds: Normal breath sounds and air entry. No wheezing.   Abdominal:      General: Bowel sounds are normal. There is no distension.      Palpations: Abdomen is soft.      Tenderness: There is no abdominal tenderness.   Musculoskeletal:         General: Normal range of motion.      Cervical back: Neck supple. No rigidity.   Skin:     General: Skin is warm.      Coloration: Skin is not jaundiced.      Findings: No lesion.   Neurological:      General: No focal deficit present.      Mental Status: She is alert and oriented to person, place, and time.      Cranial Nerves: No cranial nerve deficit.   Psychiatric:         Attention and Perception: Attention normal.         Mood and Affect: Mood normal.         Behavior: Behavior normal. Behavior is cooperative.         Thought Content: Thought content normal.         Cognition and Memory: Cognition normal.               Significant Labs: All pertinent labs within the past 24 hours have been reviewed.  BMP:   Recent Labs   Lab 04/09/25  0515   *      K 3.6   *   CO2 21*   BUN 36*   CREATININE 1.42*   CALCIUM 8.1*     CBC:   Recent Labs   Lab 04/08/25  0542 04/09/25  0515   WBC 29.92* 26.58*   HGB 10.2* 10.1*   HCT 31.5* 31.5*   * 130*     CMP:   Recent Labs   Lab 04/08/25  0542 04/09/25  0515   * 142   K 3.6 3.6   * 112*   CO2 22* 21*   * 227*   BUN 39* 36*   CREATININE 1.39* 1.42*   CALCIUM 8.2* 8.1*   PROT  --  4.5*   ALBUMIN  --  1.5*   BILITOT  --  0.7   ALKPHOS  --  179*   AST  --  39   ALT  --  55   ANIONGAP 13 13       Significant Imaging: I have reviewed all pertinent imaging results/findings within the past 24 hours.      Intake/Output - Last 3 Shifts         04/08 0700  04/09 0659 04/09 0700  04/10 0659    P.O.  0    Total  Intake(mL/kg)  0 (0)    Urine (mL/kg/hr) 200 (0.1)     Stool      Total Output 200     Net -200 0          Stool Occurrence 1 x           Microbiology Results (last 7 days)       Procedure Component Value Units Date/Time    Clostridioides difficile toxin/antigen with reflex to PCR [[id number]]     Order Status: Canceled Specimen: Stool              Statement Selected